# Patient Record
Sex: FEMALE | Race: BLACK OR AFRICAN AMERICAN | URBAN - METROPOLITAN AREA
[De-identification: names, ages, dates, MRNs, and addresses within clinical notes are randomized per-mention and may not be internally consistent; named-entity substitution may affect disease eponyms.]

---

## 2024-07-29 ENCOUNTER — LAB OUTSIDE AN ENCOUNTER (OUTPATIENT)
Dept: URBAN - METROPOLITAN AREA CLINIC 96 | Facility: CLINIC | Age: 44
End: 2024-07-29

## 2024-07-29 ENCOUNTER — OFFICE VISIT (OUTPATIENT)
Dept: URBAN - METROPOLITAN AREA CLINIC 96 | Facility: CLINIC | Age: 44
End: 2024-07-29
Payer: COMMERCIAL

## 2024-07-29 VITALS
DIASTOLIC BLOOD PRESSURE: 78 MMHG | SYSTOLIC BLOOD PRESSURE: 125 MMHG | BODY MASS INDEX: 20.25 KG/M2 | HEIGHT: 67 IN | RESPIRATION RATE: 18 BRPM | TEMPERATURE: 99.9 F | WEIGHT: 129 LBS | HEART RATE: 74 BPM

## 2024-07-29 DIAGNOSIS — K59.09 CHRONIC CONSTIPATION: ICD-10-CM

## 2024-07-29 DIAGNOSIS — R13.19 ESOPHAGEAL DYSPHAGIA: ICD-10-CM

## 2024-07-29 DIAGNOSIS — Z80.0 FAMILY HX OF COLON CANCER: ICD-10-CM

## 2024-07-29 PROCEDURE — 99204 OFFICE O/P NEW MOD 45 MIN: CPT

## 2024-07-29 NOTE — HPI-TODAY'S VISIT:
43-year-old female with past medical history of uterine fibroids and PCOS presents for evaluation of dysphagia and chronic constipation. . Patient reports esophageal dysphagia described as "effortful swallowing" for the past few years.  Denies food impaction or regurgitation of food bolus.  Denies coughing or choking with swallowing.  Patient notes her mother and grandmother had to undergo esophageal dilation in the past. Denies odynophagia, change in appetite, unexplained weight loss, and postprandial pain. She has been taking OTC Pepcid once a day and Acidil (homeopathic medication for indigestion) . Patient states she has been struggling with constipation for most of her life.  She does not have a daily bowel regimen. Reports she went 4 days last week without having a bowel movement.  She complains of incomplete evacuation frequently. Stools are described as small pellets. She believes symptoms have been worsening over the past year. . Denies prior history of EGD or colonoscopy. Family history significant for paternal aunt with colon cancer, maternal great aunt with colon cancer, and cousin with pancreatic cancer.  Mother with history of colon polyps.  Ages at diagnosis unknown. Denies use of NSAIDs, tobacco, vaping, alcohol. UTD with annual physicals with gynecologist - per patient, routine labs unremarkable.

## 2024-07-30 ENCOUNTER — DASHBOARD ENCOUNTERS (OUTPATIENT)
Age: 44
End: 2024-07-30

## 2024-08-16 ENCOUNTER — CLAIMS CREATED FROM THE CLAIM WINDOW (OUTPATIENT)
Dept: URBAN - METROPOLITAN AREA CLINIC 4 | Facility: CLINIC | Age: 44
End: 2024-08-16
Payer: COMMERCIAL

## 2024-08-16 ENCOUNTER — OFFICE VISIT (OUTPATIENT)
Dept: URBAN - METROPOLITAN AREA SURGERY CENTER 18 | Facility: SURGERY CENTER | Age: 44
End: 2024-08-16
Payer: COMMERCIAL

## 2024-08-16 DIAGNOSIS — R13.19 CERVICAL DYSPHAGIA: ICD-10-CM

## 2024-08-16 DIAGNOSIS — K21.9 GERD: ICD-10-CM

## 2024-08-16 DIAGNOSIS — K31.89 OTHER DISEASES OF STOMACH AND DUODENUM: ICD-10-CM

## 2024-08-16 DIAGNOSIS — K21.9 ACID REFLUX: ICD-10-CM

## 2024-08-16 DIAGNOSIS — K59.09 OTHER CONSTIPATION: ICD-10-CM

## 2024-08-16 DIAGNOSIS — Z80.0 BROTHER AT YOUNG AGE FAMILY HISTORY OF COLON CANCER: ICD-10-CM

## 2024-08-16 DIAGNOSIS — K64.8 INTERNAL HEMORRHOIDS: ICD-10-CM

## 2024-08-16 DIAGNOSIS — K21.9 GASTRO-ESOPHAGEAL REFLUX DISEASE WITHOUT ESOPHAGITIS: ICD-10-CM

## 2024-08-16 DIAGNOSIS — Z80.0 FAMILY HISTORY OF COLON CANCER: ICD-10-CM

## 2024-08-16 PROCEDURE — 43239 EGD BIOPSY SINGLE/MULTIPLE: CPT | Performed by: INTERNAL MEDICINE

## 2024-08-16 PROCEDURE — 00813 ANES UPR LWR GI NDSC PX: CPT | Performed by: NURSE ANESTHETIST, CERTIFIED REGISTERED

## 2024-08-16 PROCEDURE — 88312 SPECIAL STAINS GROUP 1: CPT | Performed by: PATHOLOGY

## 2024-08-16 PROCEDURE — 88305 TISSUE EXAM BY PATHOLOGIST: CPT | Performed by: PATHOLOGY

## 2024-08-16 PROCEDURE — 45378 DIAGNOSTIC COLONOSCOPY: CPT | Performed by: INTERNAL MEDICINE

## 2024-08-27 ENCOUNTER — WEB ENCOUNTER (OUTPATIENT)
Dept: URBAN - METROPOLITAN AREA CLINIC 96 | Facility: CLINIC | Age: 44
End: 2024-08-27

## 2024-09-09 ENCOUNTER — OFFICE VISIT (OUTPATIENT)
Dept: URBAN - METROPOLITAN AREA CLINIC 96 | Facility: CLINIC | Age: 44
End: 2024-09-09

## 2024-09-20 ENCOUNTER — OFFICE VISIT (OUTPATIENT)
Dept: URBAN - METROPOLITAN AREA CLINIC 96 | Facility: CLINIC | Age: 44
End: 2024-09-20
Payer: COMMERCIAL

## 2024-09-20 VITALS
HEART RATE: 85 BPM | HEIGHT: 67 IN | DIASTOLIC BLOOD PRESSURE: 73 MMHG | BODY MASS INDEX: 20.25 KG/M2 | TEMPERATURE: 98.8 F | SYSTOLIC BLOOD PRESSURE: 129 MMHG | WEIGHT: 129 LBS

## 2024-09-20 DIAGNOSIS — R14.0 BLOATING: ICD-10-CM

## 2024-09-20 DIAGNOSIS — K59.09 CHRONIC CONSTIPATION: ICD-10-CM

## 2024-09-20 DIAGNOSIS — Z80.0 FAMILY HX OF COLON CANCER: ICD-10-CM

## 2024-09-20 DIAGNOSIS — R13.19 ESOPHAGEAL DYSPHAGIA: ICD-10-CM

## 2024-09-20 DIAGNOSIS — R12 HEARTBURN: ICD-10-CM

## 2024-09-20 PROCEDURE — 99214 OFFICE O/P EST MOD 30 MIN: CPT

## 2024-09-20 NOTE — HPI-OTHER HISTORIES
Previously 7/2024 with MILAGROS Flores: 43-year-old female with past medical history of uterine fibroids and PCOS presents for evaluation of dysphagia and chronic constipation. . Patient reports esophageal dysphagia described as "effortful swallowing" for the past few years. Denies food impaction or regurgitation of food bolus. Denies coughing or choking with swallowing. Patient notes her mother and grandmother had to undergo esophageal dilation in the past. Denies odynophagia, change in appetite, unexplained weight loss, and postprandial pain. She has been taking OTC Pepcid once a day and Acidil (homeopathic medication for indigestion) . Patient states she has been struggling with constipation for most of her life. She does not have a daily bowel regimen. Reports she went 4 days last week without having a bowel movement. She complains of incomplete evacuation frequently. Stools are described as small pellets. She believes symptoms have been worsening over the past year. . Denies prior history of EGD or colonoscopy. Family history significant for paternal aunt with colon cancer, maternal great aunt with colon cancer, and cousin with pancreatic cancer. Mother with history of colon polyps. Ages at diagnosis unknown. Denies use of NSAIDs, tobacco, vaping, alcohol. UTD with annual physicals with gynecologist - per patient, routine labs unremarkable.

## 2024-09-20 NOTE — HPI-TODAY'S VISIT:
43 year old female presents for follow-up after EGD and colonoscopy. Frustrated that there is "no answer" for her symptoms from the procedures. Has BMs once a day.  Believes normal bowel regimen is BM after every meal. Reports bloating and abdominal discomfort after sharon meal - she believes evacuating her bowels at that time would improve her symptoms. Still has small, hard stool. Increased heartburn since the EGD.  Will sometimes wake her up.  Can usually feel throughout the day. Admits she has not had Acidil recently. . Colonoscopy, 8/16/2024, Dr. Inman: Normal terminal ileum.  Mild internal hemorrhoids.  No specimens collected.  Repeat age 47 for screening purposes. . EGD, 8/16/2024, Dr. Inman: Normal duodenum.  Diffuse mildly erythematous mucosa in stomach.  Normal esophagus.  Esophageal lumen widely patent with no evidence of stricture or narrowing.  Stomach biopsies with PPI effect, no evidence of H. pylori or intestinal metaplasia.  GE junction biopsy with reflux type changes, no evidence of PE or EOE.

## 2024-09-21 PROBLEM — 312824007: Status: ACTIVE | Noted: 2024-09-21

## 2024-09-21 PROBLEM — 40890009: Status: ACTIVE | Noted: 2024-09-21

## 2024-09-21 PROBLEM — 16331000: Status: ACTIVE | Noted: 2024-09-21

## 2024-09-21 PROBLEM — 236069009: Status: ACTIVE | Noted: 2024-09-21

## 2024-09-21 PROBLEM — 116289008: Status: ACTIVE | Noted: 2024-09-21
